# Patient Record
Sex: FEMALE | Race: WHITE | Employment: UNEMPLOYED | URBAN - METROPOLITAN AREA
[De-identification: names, ages, dates, MRNs, and addresses within clinical notes are randomized per-mention and may not be internally consistent; named-entity substitution may affect disease eponyms.]

---
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
## 2023-09-02 NOTE — PROGRESS NOTES
Abby Rose presents to Urgent Care Patient arriving with: alone with complaint of sore throat, possible fever, throat is getting worse each day. Sore glands.  Onset: Started Wednesday night, 8/30.      Can leave detailed message on   mobile phone:  [phone] cell phone.  [employer] [facility] [phone]      Family therapy meeting. Present mother, pt and this writer.  Mother expressed continued concern for pt's poor eating, too much time on her phone/internet and pt isolating more. I suggested mother put on a monitor for how much time pt is on her iphone and they can both look at it together in a week to see how much time pt is spending on what. Mother stated pt also does not help around the house and last night didn't want to eat with the family. Mother set up the expectation that pt sit with her parents during meals even if she doesn't eat and pt agreed.     Pt stated that next week she just wants to relax next week, sleep in and watch TV. She agreed to also try to get outside. Mother would like pt more scheduled and gives her many ideas but pt states she doesn't want to do those things. She has a 2 week trip planned in July, 1/2 of the time with father. She stated she doesn't like to plan in advance. Mother stated she has a friend who can drive pt and pt's friends to activities as needed since she is retired and has time. Pt stated she felt comfortable asking for a ride.     Plan is for pt to see therapist Rissa Ceja for individual therapy and Leeann Julian for family therapy, both at [facility]. Pt have medication follow-up with Dr Linda [facility] at [facility]. Mother will call for appointments and will let me know when they are scheduled. Pt discharged after the family therapy meeting.